# Patient Record
Sex: MALE
[De-identification: names, ages, dates, MRNs, and addresses within clinical notes are randomized per-mention and may not be internally consistent; named-entity substitution may affect disease eponyms.]

---

## 2024-10-02 ENCOUNTER — APPOINTMENT (OUTPATIENT)
Dept: OTOLARYNGOLOGY | Facility: CLINIC | Age: 34
End: 2024-10-02
Payer: COMMERCIAL

## 2024-10-02 DIAGNOSIS — S02.2XXA FRACTURE OF NASAL BONES, INITIAL ENCOUNTER FOR CLOSED FRACTURE: ICD-10-CM

## 2024-10-02 DIAGNOSIS — Z78.9 OTHER SPECIFIED HEALTH STATUS: ICD-10-CM

## 2024-10-02 DIAGNOSIS — J34.89 OTHER SPECIFIED DISORDERS OF NOSE AND NASAL SINUSES: ICD-10-CM

## 2024-10-02 PROBLEM — Z00.00 ENCOUNTER FOR PREVENTIVE HEALTH EXAMINATION: Status: ACTIVE | Noted: 2024-10-02

## 2024-10-02 PROCEDURE — 31231 NASAL ENDOSCOPY DX: CPT

## 2024-10-02 PROCEDURE — 99203 OFFICE O/P NEW LOW 30 MIN: CPT | Mod: 25

## 2024-10-02 NOTE — PHYSICAL EXAM
[Normal] : extraocular movements are normal [FreeTextEntry1] : septal perforation, no stepoff noted [FreeTextEntry2] : ecchymosis to L maxillary, no stepoff noted to bilaterally maxillary region
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
## 2024-10-02 NOTE — PROCEDURE
[Rigid Endoscope] : examined with a rigid endoscope [FreeTextEntry6] : eval for septal hematoma findings: septal perforation, no hematoma

## 2024-10-02 NOTE — ASSESSMENT
[FreeTextEntry1] : 1. nasal fx -stable, no step-off noted -will observe for now -advised patient to avoid touching the nose  2. septal perforation -advised patient to keep nose lubricated